# Patient Record
Sex: FEMALE | Employment: UNEMPLOYED | URBAN - METROPOLITAN AREA
[De-identification: names, ages, dates, MRNs, and addresses within clinical notes are randomized per-mention and may not be internally consistent; named-entity substitution may affect disease eponyms.]

---

## 2024-10-23 ENCOUNTER — APPOINTMENT (OUTPATIENT)
Dept: OBSTETRICS AND GYNECOLOGY | Facility: CLINIC | Age: 24
End: 2024-10-23
Payer: COMMERCIAL

## 2024-10-23 VITALS — WEIGHT: 137 LBS | DIASTOLIC BLOOD PRESSURE: 68 MMHG | SYSTOLIC BLOOD PRESSURE: 104 MMHG

## 2024-10-23 DIAGNOSIS — O09.891 HISTORY OF PRETERM DELIVERY, CURRENTLY PREGNANT IN FIRST TRIMESTER (HHS-HCC): ICD-10-CM

## 2024-10-23 DIAGNOSIS — Z34.81 ENCOUNTER FOR SUPERVISION OF NORMAL PREGNANCY IN MULTIGRAVIDA IN FIRST TRIMESTER: ICD-10-CM

## 2024-10-23 DIAGNOSIS — O09.891 SHORT INTERVAL BETWEEN PREGNANCIES AFFECTING PREGNANCY IN FIRST TRIMESTER, ANTEPARTUM (HHS-HCC): Primary | ICD-10-CM

## 2024-10-23 PROCEDURE — 87186 SC STD MICRODIL/AGAR DIL: CPT

## 2024-10-23 PROCEDURE — 87086 URINE CULTURE/COLONY COUNT: CPT

## 2024-10-23 PROCEDURE — 87491 CHLMYD TRACH DNA AMP PROBE: CPT

## 2024-10-23 PROCEDURE — 87591 N.GONORRHOEAE DNA AMP PROB: CPT

## 2024-10-23 PROCEDURE — 99203 OFFICE O/P NEW LOW 30 MIN: CPT

## 2024-10-23 SDOH — ECONOMIC STABILITY: FOOD INSECURITY: WITHIN THE PAST 12 MONTHS, YOU WORRIED THAT YOUR FOOD WOULD RUN OUT BEFORE YOU GOT MONEY TO BUY MORE.: NEVER TRUE

## 2024-10-23 SDOH — ECONOMIC STABILITY: TRANSPORTATION INSECURITY
IN THE PAST 12 MONTHS, HAS LACK OF TRANSPORTATION KEPT YOU FROM MEETINGS, WORK, OR FROM GETTING THINGS NEEDED FOR DAILY LIVING?: NO

## 2024-10-23 SDOH — ECONOMIC STABILITY: FOOD INSECURITY: WITHIN THE PAST 12 MONTHS, THE FOOD YOU BOUGHT JUST DIDN'T LAST AND YOU DIDN'T HAVE MONEY TO GET MORE.: NEVER TRUE

## 2024-10-23 SDOH — ECONOMIC STABILITY: TRANSPORTATION INSECURITY
IN THE PAST 12 MONTHS, HAS THE LACK OF TRANSPORTATION KEPT YOU FROM MEDICAL APPOINTMENTS OR FROM GETTING MEDICATIONS?: NO

## 2024-10-23 ASSESSMENT — EDINBURGH POSTNATAL DEPRESSION SCALE (EPDS)
I HAVE FELT SCARED OR PANICKY FOR NO GOOD REASON: NO, NOT AT ALL
THE THOUGHT OF HARMING MYSELF HAS OCCURRED TO ME: NEVER
TOTAL SCORE: 4
THINGS HAVE BEEN GETTING ON TOP OF ME: NO, MOST OF THE TIME I HAVE COPED QUITE WELL
I HAVE FELT SAD OR MISERABLE: NO, NOT AT ALL
I HAVE BEEN ANXIOUS OR WORRIED FOR NO GOOD REASON: HARDLY EVER
I HAVE BEEN ABLE TO LAUGH AND SEE THE FUNNY SIDE OF THINGS: AS MUCH AS I ALWAYS COULD
I HAVE BEEN SO UNHAPPY THAT I HAVE HAD DIFFICULTY SLEEPING: NOT VERY OFTEN
I HAVE BLAMED MYSELF UNNECESSARILY WHEN THINGS WENT WRONG: NOT VERY OFTEN
I HAVE LOOKED FORWARD WITH ENJOYMENT TO THINGS: AS MUCH AS I EVER DID
I HAVE BEEN SO UNHAPPY THAT I HAVE BEEN CRYING: NO, NEVER

## 2024-10-23 ASSESSMENT — ENCOUNTER SYMPTOMS
HEMATOLOGIC/LYMPHATIC NEGATIVE: 0
PSYCHIATRIC NEGATIVE: 0
ENDOCRINE NEGATIVE: 0
RESPIRATORY NEGATIVE: 0
CONSTITUTIONAL NEGATIVE: 0
EYES NEGATIVE: 0
NEUROLOGICAL NEGATIVE: 0
CARDIOVASCULAR NEGATIVE: 0
GASTROINTESTINAL NEGATIVE: 0
MUSCULOSKELETAL NEGATIVE: 0
ALLERGIC/IMMUNOLOGIC NEGATIVE: 0

## 2024-10-23 ASSESSMENT — SOCIAL DETERMINANTS OF HEALTH (SDOH)
WITHIN THE LAST YEAR, HAVE YOU BEEN KICKED, HIT, SLAPPED, OR OTHERWISE PHYSICALLY HURT BY YOUR PARTNER OR EX-PARTNER?: NO
WITHIN THE LAST YEAR, HAVE YOU BEEN AFRAID OF YOUR PARTNER OR EX-PARTNER?: NO
WITHIN THE LAST YEAR, HAVE YOU BEEN HUMILIATED OR EMOTIONALLY ABUSED IN OTHER WAYS BY YOUR PARTNER OR EX-PARTNER?: NO
WITHIN THE LAST YEAR, HAVE TO BEEN RAPED OR FORCED TO HAVE ANY KIND OF SEXUAL ACTIVITY BY YOUR PARTNER OR EX-PARTNER?: NO

## 2024-10-23 ASSESSMENT — PATIENT HEALTH QUESTIONNAIRE - PHQ9
2. FEELING DOWN, DEPRESSED OR HOPELESS: NOT AT ALL
1. LITTLE INTEREST OR PLEASURE IN DOING THINGS: NOT AT ALL
SUM OF ALL RESPONSES TO PHQ9 QUESTIONS 1 & 2: 0

## 2024-10-23 NOTE — PROGRESS NOTES
"Abril Muller \"Sis" is a 24 y.o.  at 5w1d with a working estimated date of delivery of 2025, by Last Menstrual Period who presents for an initial prenatal visit. Patient does not think she will keep this pregnancy. She would like to know options for termination.     Taking prenatal vitamin: Yes  Ultrasound completed this pregnancy: No  Last pap: 2023  Prior pregnancy complications: anemia, sickle cell traits,  delivery at 35 weeks.   History of hypertension:  No    OB History    Para Term  AB Living   3 1   1 1 1   SAB IAB Ectopic Multiple Live Births   1       1      # Outcome Date GA Lbr Jason/2nd Weight Sex Type Anes PTL Lv   3 Current            2  23   2.381 kg F Vag-Spont None Y SHEILA   1 SAB                History reviewed. No pertinent past medical history.   History reviewed. No pertinent surgical history.   Social History     Socioeconomic History    Marital status: Single   Tobacco Use    Smoking status: Never    Smokeless tobacco: Never   Substance and Sexual Activity    Alcohol use: Not Currently     Comment: REFUSE    Drug use: Not Currently     Types: Marijuana    Sexual activity: Yes     Partners: Male     Birth control/protection: Injection     Social Drivers of Health     Food Insecurity: No Food Insecurity (10/23/2024)    Hunger Vital Sign     Worried About Running Out of Food in the Last Year: Never true     Ran Out of Food in the Last Year: Never true   Transportation Needs: No Transportation Needs (10/23/2024)    PRAPARE - Transportation     Lack of Transportation (Medical): No     Lack of Transportation (Non-Medical): No   Intimate Partner Violence: Not At Risk (10/23/2024)    Humiliation, Afraid, Rape, and Kick questionnaire     Fear of Current or Ex-Partner: No     Emotionally Abused: No     Physically Abused: No     Sexually Abused: No      [place]  Depression Scale Total: 4    Objective   Physical Exam  Weight: 62.1 kg " (137 lb)  TWG: Not found.   Pregravid BMI: Could not be calculated  BP: 104/68      General:   Alert and oriented x 3   Abdomen: Soft, non tender. Gravid <10wks   Further physical exam deferred.       Assessment   Problem List Items Addressed This Visit    None  Visit Diagnoses       Short interval between pregnancies affecting pregnancy in first trimester, antepartum ([facility])    -  Primary    Encounter for supervision of normal pregnancy in multigravida in first trimester        Relevant Orders    US MAC OB imaging order    CBC Anemia Panel With Reflex,Pregnancy    Hepatitis B surface antigen    Hepatitis C antibody    HIV 1/2 Antigen/Antibody Screen with Reflex to Confirmation    Hgb  A1C    Hemoglobin identification with path review    Rubella Antibody, IgG    Syphilis Screen with Reflex    Type And Screen    Urine Culture clinic collect    Urine GC / Chlam clinic collect    History of  delivery, currently pregnant in first trimester ([facility])                 Plan   - Discussed with patient that we do not perform termination at , discussed other options including outside clinics.   - Discussed NOB labs ordered, and discussed follow up if she would like to continue with pregnancy.   - Reviewed bleeding precautions, warning signs, when to call provider; phone number provided  - follow up with annual and as needed.     MACIEL Lopez-SAIMA

## 2024-10-24 LAB
C TRACH RRNA SPEC QL NAA+PROBE: NEGATIVE
N GONORRHOEA DNA SPEC QL PROBE+SIG AMP: NEGATIVE

## 2024-10-25 DIAGNOSIS — O23.41 URINARY TRACT INFECTION IN MOTHER DURING FIRST TRIMESTER OF PREGNANCY (HHS-HCC): Primary | ICD-10-CM

## 2024-10-25 RX ORDER — AMOXICILLIN AND CLAVULANATE POTASSIUM 875; 125 MG/1; MG/1
875 TABLET, FILM COATED ORAL 2 TIMES DAILY
Qty: 14 TABLET | Refills: 0 | Status: SHIPPED | OUTPATIENT
Start: 2024-10-25 | End: 2024-11-01

## 2024-10-26 LAB — BACTERIA UR CULT: ABNORMAL

## 2024-10-28 ENCOUNTER — TELEPHONE (OUTPATIENT)
Dept: OBSTETRICS AND GYNECOLOGY | Facility: CLINIC | Age: 24
End: 2024-10-28
Payer: COMMERCIAL

## 2024-11-27 ENCOUNTER — ROUTINE PRENATAL (OUTPATIENT)
Dept: OBSTETRICS AND GYNECOLOGY | Facility: HOSPITAL | Age: 24
End: 2024-11-27
Payer: COMMERCIAL

## 2024-11-27 ENCOUNTER — LAB (OUTPATIENT)
Dept: LAB | Facility: LAB | Age: 24
End: 2024-11-27
Payer: COMMERCIAL

## 2024-11-27 VITALS
DIASTOLIC BLOOD PRESSURE: 68 MMHG | WEIGHT: 133 LBS | SYSTOLIC BLOOD PRESSURE: 101 MMHG | BODY MASS INDEX: 20.16 KG/M2 | HEIGHT: 68 IN

## 2024-11-27 DIAGNOSIS — Z34.81 ENCOUNTER FOR SUPERVISION OF NORMAL PREGNANCY IN MULTIGRAVIDA IN FIRST TRIMESTER: ICD-10-CM

## 2024-11-27 DIAGNOSIS — N30.00 ACUTE CYSTITIS WITHOUT HEMATURIA: ICD-10-CM

## 2024-11-27 DIAGNOSIS — Z3A.10 10 WEEKS GESTATION OF PREGNANCY (HHS-HCC): Primary | ICD-10-CM

## 2024-11-27 PROBLEM — D57.3 SICKLE CELL TRAIT IN MOTHER AFFECTING PREGNANCY (MULTI): Status: ACTIVE | Noted: 2024-11-27

## 2024-11-27 PROBLEM — O99.019 SICKLE CELL TRAIT IN MOTHER AFFECTING PREGNANCY (MULTI): Status: ACTIVE | Noted: 2024-11-27

## 2024-11-27 LAB
ABO GROUP (TYPE) IN BLOOD: NORMAL
ANTIBODY SCREEN: NORMAL
ERYTHROCYTE [DISTWIDTH] IN BLOOD BY AUTOMATED COUNT: 16.7 % (ref 11.5–14.5)
EST. AVERAGE GLUCOSE BLD GHB EST-MCNC: 103 MG/DL
FERRITIN SERPL-MCNC: 21 NG/ML
HBA1C MFR BLD: 5.2 %
HBV SURFACE AG SERPL QL IA: NONREACTIVE
HCT VFR BLD AUTO: 31.3 % (ref 36–46)
HCV AB SER QL: NONREACTIVE
HGB BLD-MCNC: 10.4 G/DL (ref 12–16)
HIV 1+2 AB+HIV1 P24 AG SERPL QL IA: NONREACTIVE
IRON SATN MFR SERPL: NORMAL %
IRON SERPL-MCNC: 34 UG/DL
MCH RBC QN AUTO: 24.6 PG (ref 26–34)
MCHC RBC AUTO-ENTMCNC: 33.2 G/DL (ref 32–36)
MCV RBC AUTO: 74 FL (ref 80–100)
NRBC BLD-RTO: 0 /100 WBCS (ref 0–0)
PLATELET # BLD AUTO: 517 X10*3/UL (ref 150–450)
RBC # BLD AUTO: 4.23 X10*6/UL (ref 4–5.2)
REFLEX ADDED, ANEMIA PANEL: NORMAL
RH FACTOR (ANTIGEN D): NORMAL
RUBV IGG SERPL IA-ACNC: 2.5 IA
RUBV IGG SERPL QL IA: POSITIVE
TIBC SERPL-MCNC: NORMAL UG/DL
TREPONEMA PALLIDUM IGG+IGM AB [PRESENCE] IN SERUM OR PLASMA BY IMMUNOASSAY: NONREACTIVE
UIBC SERPL-MCNC: >450 UG/DL
WBC # BLD AUTO: 4.4 X10*3/UL (ref 4.4–11.3)

## 2024-11-27 PROCEDURE — 99214 OFFICE O/P EST MOD 30 MIN: CPT | Mod: TH | Performed by: NURSE PRACTITIONER

## 2024-11-27 PROCEDURE — 86850 RBC ANTIBODY SCREEN: CPT

## 2024-11-27 PROCEDURE — 82728 ASSAY OF FERRITIN: CPT

## 2024-11-27 PROCEDURE — 83020 HEMOGLOBIN ELECTROPHORESIS: CPT

## 2024-11-27 PROCEDURE — 86900 BLOOD TYPING SEROLOGIC ABO: CPT

## 2024-11-27 PROCEDURE — 83021 HEMOGLOBIN CHROMOTOGRAPHY: CPT

## 2024-11-27 PROCEDURE — 87340 HEPATITIS B SURFACE AG IA: CPT

## 2024-11-27 PROCEDURE — 36415 COLL VENOUS BLD VENIPUNCTURE: CPT

## 2024-11-27 PROCEDURE — 83550 IRON BINDING TEST: CPT

## 2024-11-27 PROCEDURE — 86803 HEPATITIS C AB TEST: CPT

## 2024-11-27 PROCEDURE — 85027 COMPLETE CBC AUTOMATED: CPT

## 2024-11-27 PROCEDURE — 86317 IMMUNOASSAY INFECTIOUS AGENT: CPT

## 2024-11-27 PROCEDURE — 87389 HIV-1 AG W/HIV-1&-2 AB AG IA: CPT

## 2024-11-27 PROCEDURE — 86901 BLOOD TYPING SEROLOGIC RH(D): CPT

## 2024-11-27 PROCEDURE — 99214 OFFICE O/P EST MOD 30 MIN: CPT | Performed by: NURSE PRACTITIONER

## 2024-11-27 PROCEDURE — 87186 SC STD MICRODIL/AGAR DIL: CPT

## 2024-11-27 PROCEDURE — 86780 TREPONEMA PALLIDUM: CPT

## 2024-11-27 PROCEDURE — 87086 URINE CULTURE/COLONY COUNT: CPT

## 2024-11-27 PROCEDURE — 83036 HEMOGLOBIN GLYCOSYLATED A1C: CPT

## 2024-11-27 RX ORDER — NAPROXEN SODIUM 220 MG/1
81 TABLET, FILM COATED ORAL 2 TIMES DAILY
Qty: 180 TABLET | Refills: 3 | Status: SHIPPED | OUTPATIENT
Start: 2024-11-27 | End: 2025-11-27

## 2024-11-27 NOTE — PROGRESS NOTES
"Ob Visit  24     SUBJECTIVE      HPI: Mirlande Muller \"Niki\" is a 24 y.o.  at 10w1d here for RPNV.       OBJECTIVE  Visit Vitals  /68   Ht 1.727 m (5' 8\")   Wt 60.3 kg (133 lb)   LMP 2024 (Approximate)   BMI 20.22 kg/m²   OB Status Pregnant   Smoking Status Never   BSA 1.7 m²            ASSESSMENT & PLAN    Mirlande Muller \"Niki\" is a 24 y.o.  at 10w1d here for the following concerns we addressed today:    Problem List Items Addressed This Visit          Ob-Gyn Problems    10 weeks gestation of pregnancy ([facility]) - Primary    Overview     Desired provider in labor: [] CNM  [] Physician  [x] Blood Products: [x] Yes, accepts [] No, needs counseling  [x] Initial BMI: Could not be calculated   [] Prenatal Labs: pended  [] Cervical Cancer Screening up to date  [] Rh status:   [] Genetic Screening:  pending   [] NT US: (11-13 wks) ordered  [x] Baby ASA (if indicated):oredered and encouraged compliance  [] Pregnancy dated by:     [] Anatomy US: (19-20 wks)  [] Federal Sterilization consent signed (if indicated):  [] 1hr GCT at 24-28wks:  [] Rhogam (if indicated):   [] Fetal Surveillance (if indicated):  [] Tdap (27-32 wks, may be given up to 36 wks if initial window missed):   [] RSV (32-36 wks) (Sept. to end ):   [] Flu Vaccine:    [] Breastfeeding:  [] Postpartum Birth control method:   [] GBS at 36 - 37 wks:  [] 39 weeks discussion of IOL vs. Expectant management:  [] Mode of delivery ( anticipated ):     24-year-old  here today for second prenatal visit at 10 weeks gestational age.  Prior note reported patient was considering termination of pregnancy but she has decided to continue.  She did not get prenatal blood work drawn and she is reminded about this today and we added myriad genetic screening.    Baby aspirin prescription sent to the pharmacy and patient states she is already taking prenatal vitamins.  Able to hear fetal heart tones today         Relevant Medications    " aspirin 81 mg chewable tablet    Other Relevant Orders    [facility] Prequel Prenatal Screen    US MAC OB imaging order     Other Visit Diagnoses       Acute cystitis without hematuria        Relevant Orders    Urine Culture              RTC in 4 weeks      Vaishali Nava, APRN-CNP

## 2024-11-29 LAB — BACTERIA UR CULT: ABNORMAL

## 2024-12-02 DIAGNOSIS — O99.011: Primary | ICD-10-CM

## 2024-12-02 LAB
HGB A MFR BLD ELPH: 59.9 % (ref 95.8–98)
HGB A2 MFR BLD ELPH: 3 % (ref 2–3.3)
HGB F MFR BLD ELPH: 0 % (ref 0–0.9)
HGB FRACT BLD CE-IMP: ABNORMAL
HGB XXX MFR BLD ELPH: ABNORMAL %

## 2024-12-02 RX ORDER — FERROUS SULFATE 325(65) MG
325 TABLET, DELAYED RELEASE (ENTERIC COATED) ORAL
Qty: 90 TABLET | Refills: 11 | Status: SHIPPED
Start: 2024-12-02 | End: 2024-12-03

## 2024-12-03 ENCOUNTER — TELEPHONE (OUTPATIENT)
Dept: OBSTETRICS AND GYNECOLOGY | Facility: CLINIC | Age: 24
End: 2024-12-03
Payer: COMMERCIAL

## 2024-12-03 DIAGNOSIS — O99.011: ICD-10-CM

## 2024-12-03 RX ORDER — FERROUS SULFATE 325(65) MG
325 TABLET, DELAYED RELEASE (ENTERIC COATED) ORAL 2 TIMES DAILY
Qty: 60 TABLET | Refills: 11 | Status: SHIPPED | OUTPATIENT
Start: 2024-12-03 | End: 2025-12-03

## 2024-12-04 LAB
HEMOGLOBIN A2: 3.6 % (ref 2–3.5)
HEMOGLOBIN A: 59.5 % (ref 95.8–98)
HEMOGLOBIN F: 0.3 % (ref 0–2)
HEMOGLOBIN IDENTIFICATION INTERPRETATION: ABNORMAL
HEMOGLOBIN S: 36.6 %
PATH REVIEW-HGB IDENTIFICATION: ABNORMAL

## 2024-12-06 LAB
COMMENTS - MP RESULT TYPE: NORMAL
SCAN RESULT: NORMAL

## 2024-12-27 ENCOUNTER — APPOINTMENT (OUTPATIENT)
Dept: OBSTETRICS AND GYNECOLOGY | Facility: HOSPITAL | Age: 24
End: 2024-12-27
Payer: COMMERCIAL